# Patient Record
Sex: MALE | Race: WHITE | NOT HISPANIC OR LATINO | URBAN - METROPOLITAN AREA
[De-identification: names, ages, dates, MRNs, and addresses within clinical notes are randomized per-mention and may not be internally consistent; named-entity substitution may affect disease eponyms.]

---

## 2017-11-21 ENCOUNTER — APPOINTMENT (OUTPATIENT)
Dept: URBAN - METROPOLITAN AREA CLINIC 217 | Age: 82
Setting detail: DERMATOLOGY
End: 2017-11-22

## 2017-11-21 DIAGNOSIS — L57.0 ACTINIC KERATOSIS: ICD-10-CM

## 2017-11-21 DIAGNOSIS — L40.0 PSORIASIS VULGARIS: ICD-10-CM

## 2017-11-21 PROCEDURE — OTHER COUNSELING: OTHER

## 2017-11-21 PROCEDURE — OTHER TREATMENT REGIMEN: OTHER

## 2017-11-21 PROCEDURE — 17000 DESTRUCT PREMALG LESION: CPT

## 2017-11-21 PROCEDURE — 99202 OFFICE O/P NEW SF 15 MIN: CPT | Mod: 25

## 2017-11-21 PROCEDURE — OTHER MIPS QUALITY: OTHER

## 2017-11-21 PROCEDURE — OTHER LIQUID NITROGEN: OTHER

## 2017-11-21 PROCEDURE — 17003 DESTRUCT PREMALG LES 2-14: CPT

## 2017-11-21 ASSESSMENT — LOCATION DETAILED DESCRIPTION DERM
LOCATION DETAILED: LEFT SUPERIOR HELIX
LOCATION DETAILED: RIGHT SUPERIOR HELIX
LOCATION DETAILED: LEFT FOREHEAD
LOCATION DETAILED: LEFT SUPERIOR MEDIAL FOREHEAD
LOCATION DETAILED: LEFT LATERAL FOREHEAD

## 2017-11-21 ASSESSMENT — LOCATION ZONE DERM
LOCATION ZONE: FACE
LOCATION ZONE: EAR

## 2017-11-21 ASSESSMENT — LOCATION SIMPLE DESCRIPTION DERM
LOCATION SIMPLE: LEFT FOREHEAD
LOCATION SIMPLE: RIGHT EAR
LOCATION SIMPLE: LEFT EAR

## 2017-11-21 NOTE — PROCEDURE: MIPS QUALITY
Detail Level: Detailed
Quality 226: Preventive Care And Screening: Tobacco Use: Screening And Cessation Intervention: Patient screened for tobacco and is a smoker AND received Cessation Counseling
Quality 110: Preventive Care And Screening: Influenza Immunization: Influenza Immunization previously received during influenza season

## 2017-11-21 NOTE — PROCEDURE: LIQUID NITROGEN
Post-Care Instructions: I reviewed with the patient in detail post-care instructions. Patient is to wear sunprotection, and avoid picking at any of the treated lesions. Pt may apply Vaseline to crusted or scabbing areas.  Cryosurgery handout given and discussed.
Duration Of Freeze Thaw-Cycle (Seconds): 7
Render Post-Care Instructions In Note?: no
Consent: The patient's consent was obtained including but not limited to risks of crusting, scabbing, blistering, scarring, darker or lighter pigmentary change, recurrence, incomplete removal and infection.
Detail Level: Zone
Number Of Freeze-Thaw Cycles: 1 freeze-thaw cycle

## 2018-05-23 ENCOUNTER — APPOINTMENT (OUTPATIENT)
Dept: URBAN - METROPOLITAN AREA CLINIC 217 | Age: 83
Setting detail: DERMATOLOGY
End: 2018-05-23

## 2018-05-23 DIAGNOSIS — L40.0 PSORIASIS VULGARIS: ICD-10-CM

## 2018-05-23 DIAGNOSIS — L57.0 ACTINIC KERATOSIS: ICD-10-CM

## 2018-05-23 DIAGNOSIS — L81.4 OTHER MELANIN HYPERPIGMENTATION: ICD-10-CM

## 2018-05-23 PROCEDURE — 99213 OFFICE O/P EST LOW 20 MIN: CPT | Mod: 25

## 2018-05-23 PROCEDURE — 17000 DESTRUCT PREMALG LESION: CPT

## 2018-05-23 PROCEDURE — OTHER COUNSELING: OTHER

## 2018-05-23 PROCEDURE — OTHER REASSURANCE: OTHER

## 2018-05-23 PROCEDURE — 17003 DESTRUCT PREMALG LES 2-14: CPT

## 2018-05-23 PROCEDURE — OTHER LIQUID NITROGEN: OTHER

## 2018-05-23 PROCEDURE — OTHER TREATMENT REGIMEN: OTHER

## 2018-05-23 ASSESSMENT — LOCATION ZONE DERM
LOCATION ZONE: FACE
LOCATION ZONE: SCALP
LOCATION ZONE: TRUNK

## 2018-05-23 ASSESSMENT — LOCATION DETAILED DESCRIPTION DERM
LOCATION DETAILED: RIGHT SUPERIOR MEDIAL FOREHEAD
LOCATION DETAILED: RIGHT CENTRAL FRONTAL SCALP
LOCATION DETAILED: RIGHT SUPERIOR MEDIAL UPPER BACK
LOCATION DETAILED: LEFT MEDIAL SUPERIOR CHEST
LOCATION DETAILED: LEFT CENTRAL EYEBROW
LOCATION DETAILED: LEFT SUPERIOR PARIETAL SCALP
LOCATION DETAILED: LEFT LATERAL TEMPLE
LOCATION DETAILED: MEDIAL FRONTAL SCALP

## 2018-05-23 ASSESSMENT — LOCATION SIMPLE DESCRIPTION DERM
LOCATION SIMPLE: FRONTAL SCALP
LOCATION SIMPLE: RIGHT UPPER BACK
LOCATION SIMPLE: LEFT TEMPLE
LOCATION SIMPLE: RIGHT FOREHEAD
LOCATION SIMPLE: RIGHT SCALP
LOCATION SIMPLE: LEFT EYEBROW
LOCATION SIMPLE: CHEST
LOCATION SIMPLE: SCALP

## 2018-05-23 NOTE — PROCEDURE: TREATMENT REGIMEN
Action 1: Continue
Detail Level: Detailed
Samples Given: Taclonex scalp solution BID PRN
Continue Regimen: Taclonex scalp and hands solution BID PRN

## 2018-05-23 NOTE — PROCEDURE: LIQUID NITROGEN
Detail Level: Zone
Duration Of Freeze Thaw-Cycle (Seconds): 7
Post-Care Instructions: I reviewed with the patient in detail post-care instructions. Patient is to wear sunprotection, and avoid picking at any of the treated lesions. Pt may apply Vaseline to crusted or scabbing areas.  Cryosurgery handout given and discussed.
Number Of Freeze-Thaw Cycles: 1 freeze-thaw cycle
Consent: The patient's consent was obtained including but not limited to risks of crusting, scabbing, blistering, scarring, darker or lighter pigmentary change, recurrence, incomplete removal and infection.
Render Post-Care Instructions In Note?: no

## 2018-12-05 ENCOUNTER — APPOINTMENT (OUTPATIENT)
Dept: URBAN - METROPOLITAN AREA CLINIC 217 | Age: 83
Setting detail: DERMATOLOGY
End: 2018-12-06

## 2018-12-05 DIAGNOSIS — L82.0 INFLAMED SEBORRHEIC KERATOSIS: ICD-10-CM

## 2018-12-05 DIAGNOSIS — L57.0 ACTINIC KERATOSIS: ICD-10-CM

## 2018-12-05 DIAGNOSIS — L40.0 PSORIASIS VULGARIS: ICD-10-CM

## 2018-12-05 PROCEDURE — OTHER LIQUID NITROGEN: OTHER

## 2018-12-05 PROCEDURE — OTHER SHAVE REMOVAL (NO PATHOLOGY): OTHER

## 2018-12-05 PROCEDURE — OTHER MIPS QUALITY: OTHER

## 2018-12-05 PROCEDURE — 99213 OFFICE O/P EST LOW 20 MIN: CPT | Mod: 25

## 2018-12-05 PROCEDURE — 17000 DESTRUCT PREMALG LESION: CPT

## 2018-12-05 PROCEDURE — OTHER COUNSELING: OTHER

## 2018-12-05 PROCEDURE — 11302 SHAVE SKIN LESION 1.1-2.0 CM: CPT | Mod: 59

## 2018-12-05 PROCEDURE — OTHER TREATMENT REGIMEN: OTHER

## 2018-12-05 PROCEDURE — 17003 DESTRUCT PREMALG LES 2-14: CPT

## 2018-12-05 ASSESSMENT — PGA PSORIASIS: PGA PSORIASIS: MILD (MILD PLAQUE ELEVATION, LIGHT ERYTHEMA, FINE SCALE PREDOMINATES)

## 2018-12-05 ASSESSMENT — LOCATION DETAILED DESCRIPTION DERM
LOCATION DETAILED: LEFT SUPERIOR PARIETAL SCALP
LOCATION DETAILED: RIGHT SUPERIOR FOREHEAD
LOCATION DETAILED: LEFT MID-UPPER BACK

## 2018-12-05 ASSESSMENT — LOCATION ZONE DERM
LOCATION ZONE: SCALP
LOCATION ZONE: TRUNK
LOCATION ZONE: FACE

## 2018-12-05 ASSESSMENT — LOCATION SIMPLE DESCRIPTION DERM
LOCATION SIMPLE: SCALP
LOCATION SIMPLE: LEFT UPPER BACK
LOCATION SIMPLE: RIGHT FOREHEAD

## 2018-12-05 NOTE — PROCEDURE: MIPS QUALITY
Quality 110: Preventive Care And Screening: Influenza Immunization: Influenza Immunization previously received during influenza season
Quality 226: Preventive Care And Screening: Tobacco Use: Screening And Cessation Intervention: Patient screened for tobacco use, is a smoker AND received Cessation Counseling
Detail Level: Detailed
Quality 111:Pneumonia Vaccination Status For Older Adults: Pneumococcal Vaccination Previously Received

## 2018-12-05 NOTE — PROCEDURE: SHAVE REMOVAL (NO PATHOLOGY)
Post-Care Instructions: I reviewed with the patient in detail post-care instructions. Patient is to keep the office bandage on and dry overnight, and then apply Polysporin, Bacitracin or Vasoline daily until healed.  Patient may irrigate with water to remove any crusting.  Wound Care written instructions given.
Detail Level: Detailed
Include Z78.9 (Other Specified Conditions Influencing Health Status) As An Associated Diagnosis?: No
Render Post-Care Instructions In Note?: yes
Consent was obtained from the patient. The risks and benefits to therapy were discussed in detail. Specifically, the risks of infection, scarring, bleeding, prolonged wound healing, incomplete removal, allergy to anesthesia, nerve injury and recurrence were addressed. Prior to the procedure, the treatment site was clearly identified and confirmed by the patient.
Size Of Lesion In Cm: 1.7
Anesthesia Type: 1% lidocaine with epinephrine
Anesthesia Volume In Cc: 1
Medical Necessity Information: It is in your best interest to select a reason for this procedure from the list below. All of these items fulfill various CMS LCD requirements except the new and changing color options.
Medical Necessity Clause: This procedure was medically necessary because the lesion that was treated was: irritated and rubbing.
Hemostasis: Aluminum Chloride and Electrocautery
Wound Care: Vaseline

## 2018-12-05 NOTE — PROCEDURE: LIQUID NITROGEN
Consent: The patient's consent was obtained including but not limited to risks of crusting, scabbing, blistering, scarring, darker or lighter pigmentary change, recurrence, incomplete removal and infection.
Detail Level: Zone
Render Post-Care Instructions In Note?: no
Number Of Freeze-Thaw Cycles: 1 freeze-thaw cycle
Duration Of Freeze Thaw-Cycle (Seconds): 7
Post-Care Instructions: I reviewed with the patient in detail post-care instructions. Patient is to wear sunprotection, and avoid picking at any of the treated lesions. Pt may apply Vaseline to crusted or scabbing areas.  Cryosurgery handout given and discussed.

## 2019-06-05 ENCOUNTER — APPOINTMENT (OUTPATIENT)
Dept: URBAN - METROPOLITAN AREA CLINIC 217 | Age: 84
Setting detail: DERMATOLOGY
End: 2019-06-05

## 2019-06-05 DIAGNOSIS — L40.0 PSORIASIS VULGARIS: ICD-10-CM

## 2019-06-05 DIAGNOSIS — D485 NEOPLASM OF UNCERTAIN BEHAVIOR OF SKIN: ICD-10-CM

## 2019-06-05 PROBLEM — D48.5 NEOPLASM OF UNCERTAIN BEHAVIOR OF SKIN: Status: ACTIVE | Noted: 2019-06-05

## 2019-06-05 PROCEDURE — OTHER SCALLOP BIOPSY: OTHER

## 2019-06-05 PROCEDURE — 99213 OFFICE O/P EST LOW 20 MIN: CPT | Mod: 25

## 2019-06-05 PROCEDURE — OTHER COUNSELING: OTHER

## 2019-06-05 PROCEDURE — OTHER TREATMENT REGIMEN: OTHER

## 2019-06-05 PROCEDURE — 11102 TANGNTL BX SKIN SINGLE LES: CPT

## 2019-06-05 ASSESSMENT — LOCATION DETAILED DESCRIPTION DERM: LOCATION DETAILED: RIGHT SUPERIOR POSTERIOR NECK

## 2019-06-05 ASSESSMENT — PGA PSORIASIS: PGA PSORIASIS: MINIMAL (MINIMAL PLAQUE ELEVATION, FAINT ERYTHEMA, OCCASIONAL FINE SCALE)

## 2019-06-05 ASSESSMENT — LOCATION SIMPLE DESCRIPTION DERM: LOCATION SIMPLE: POSTERIOR NECK

## 2019-06-05 ASSESSMENT — LOCATION ZONE DERM: LOCATION ZONE: NECK

## 2019-06-05 NOTE — PROCEDURE: SCALLOP BIOPSY
Consent: Verbal consent was obtained and risks were reviewed including but not limited to scarring, infection, bleeding, scabbing, incomplete removal, nerve damage and allergy to anesthesia.
Additional Anticipated Plans: If malignant consider Mohs surgery
Additional Anesthesia Volume In Cc (Will Not Render If 0): 0
Biopsy Type: H and E
Render Post-Care Instructions In Note?: yes
Depth Of Biopsy: dermis
Anesthesia Type: 1% Xylocaine without epinephrine
Hemostasis: Aluminum Chloride
Notification Instructions: Patient will be notified of biopsy results. However, patient instructed to call the office if not contacted within 2 weeks.
Billing Type: Third-Party Bill
Post-Care Instructions: I reviewed with the patient in detail post-care instructions. Patient is to keep the office bandage on and dry overnight, and then apply Polysporin, Bacitracin or Vasoline daily until healed.  Patient may irrigate with water to remove any crusting.  Wound Care written instructions given.
Anesthesia Volume In Cc (Will Not Render If 0): 1
Bill For Surgical Tray: no
Size Of Lesion In Cm (Optional): 2
Biopsy Method: Dermablade
Wound Care: Vaseline
Detail Level: Simple
Path Notes (To The Dermatopathologist): Cc: Dr. Vinayak Cox \\nIncisional bx only\\nArisen at site of congenital hemangioma and area involving psoriasis
Anticipated Plan (Based On Presumed Biopsy Results): Further management pending pathology report

## 2019-06-05 NOTE — PROCEDURE: TREATMENT REGIMEN
Detail Level: Detailed
Action 2: Continue
Continue Regimen: Taclonex solution BID PRN
Samples Given: Taclonex

## 2019-06-17 ENCOUNTER — APPOINTMENT (OUTPATIENT)
Dept: URBAN - METROPOLITAN AREA CLINIC 217 | Age: 84
Setting detail: DERMATOLOGY
End: 2019-06-17

## 2019-06-17 DIAGNOSIS — L28.1 PRURIGO NODULARIS: ICD-10-CM

## 2019-06-17 PROCEDURE — OTHER PATHOLOGY DISCUSSION: OTHER

## 2019-06-17 PROCEDURE — 99213 OFFICE O/P EST LOW 20 MIN: CPT | Mod: 25

## 2019-06-17 PROCEDURE — 11900 INJECT SKIN LESIONS </W 7: CPT

## 2019-06-17 PROCEDURE — OTHER REASSURANCE: OTHER

## 2019-06-17 PROCEDURE — OTHER COUNSELING: OTHER

## 2019-06-17 PROCEDURE — OTHER INTRALESIONAL KENALOG: OTHER

## 2019-06-17 PROCEDURE — OTHER SEPARATE AND IDENTIFIABLE DOCUMENTATION: OTHER

## 2019-06-17 ASSESSMENT — LOCATION SIMPLE DESCRIPTION DERM: LOCATION SIMPLE: POSTERIOR NECK

## 2019-06-17 ASSESSMENT — LOCATION DETAILED DESCRIPTION DERM: LOCATION DETAILED: RIGHT SUPERIOR POSTERIOR NECK

## 2019-06-17 ASSESSMENT — LOCATION ZONE DERM: LOCATION ZONE: NECK

## 2019-06-17 NOTE — PROCEDURE: INTRALESIONAL KENALOG
Kenalog Preparation: Kenalog
Detail Level: Simple
Total Volume Injected (Ccs- Only Use Numbers And Decimals): 0.6
Concentration Of Solution Injected (Mg/Ml): 10.0
X Size Of Lesion In Cm (Optional): 0
Medical Necessity Clause: This procedure was medically necessary because the lesions that were treated were:
Include Z78.9 (Other Specified Conditions Influencing Health Status) As An Associated Diagnosis?: No
Consent: The risks of atrophy were reviewed with the patient.
Administered By (Optional): Dr. Love

## 2019-12-18 ENCOUNTER — APPOINTMENT (OUTPATIENT)
Dept: URBAN - METROPOLITAN AREA CLINIC 217 | Age: 84
Setting detail: DERMATOLOGY
End: 2019-12-18

## 2019-12-18 DIAGNOSIS — L40.0 PSORIASIS VULGARIS: ICD-10-CM

## 2019-12-18 PROCEDURE — OTHER MIPS QUALITY: OTHER

## 2019-12-18 PROCEDURE — OTHER TREATMENT REGIMEN: OTHER

## 2019-12-18 PROCEDURE — 99213 OFFICE O/P EST LOW 20 MIN: CPT

## 2019-12-18 PROCEDURE — OTHER COUNSELING: OTHER

## 2019-12-18 ASSESSMENT — PGA PSORIASIS: PGA PSORIASIS: MINIMAL (MINIMAL PLAQUE ELEVATION, FAINT ERYTHEMA, OCCASIONAL FINE SCALE)

## 2019-12-18 ASSESSMENT — BSA PSORIASIS: % BODY COVERED IN PSORIASIS: 1

## 2019-12-18 NOTE — PROCEDURE: MIPS QUALITY
Quality 130: Documentation Of Current Medications In The Medical Record: Current Medications Documented
Quality 226: Preventive Care And Screening: Tobacco Use: Screening And Cessation Intervention: Patient screened for tobacco use and is an ex/non-smoker
Quality 474: Zoster Vaccination Status: Shingrix vaccine was not administered for reasons documented by clinician (e.g. patient administered vaccine other than Shingrix, patient allergy or other medical reasons, patient declined or other patient reasons, vaccine not available or other system reasons)
Detail Level: Detailed
Quality 111:Pneumonia Vaccination Status For Older Adults: Pneumococcal Vaccination Previously Received
Quality 110: Preventive Care And Screening: Influenza Immunization: Influenza Immunization previously received during influenza season

## 2019-12-18 NOTE — PROCEDURE: TREATMENT REGIMEN
Continue Regimen: Taclonex solution BID PRN
Detail Level: Detailed
Action 4: Continue
Samples Given: Taclonex

## 2020-10-28 ENCOUNTER — APPOINTMENT (OUTPATIENT)
Dept: URBAN - METROPOLITAN AREA CLINIC 217 | Age: 85
Setting detail: DERMATOLOGY
End: 2020-10-28

## 2020-10-28 VITALS — TEMPERATURE: 98 F

## 2020-10-28 DIAGNOSIS — D485 NEOPLASM OF UNCERTAIN BEHAVIOR OF SKIN: ICD-10-CM

## 2020-10-28 PROBLEM — D48.5 NEOPLASM OF UNCERTAIN BEHAVIOR OF SKIN: Status: ACTIVE | Noted: 2020-10-28

## 2020-10-28 PROCEDURE — 11102 TANGNTL BX SKIN SINGLE LES: CPT

## 2020-10-28 PROCEDURE — OTHER MIPS QUALITY: OTHER

## 2020-10-28 PROCEDURE — 11103 TANGNTL BX SKIN EA SEP/ADDL: CPT

## 2020-10-28 PROCEDURE — OTHER SCALLOP BIOPSY: OTHER

## 2020-10-28 ASSESSMENT — LOCATION DETAILED DESCRIPTION DERM
LOCATION DETAILED: RIGHT LATERAL TRAPEZIAL NECK
LOCATION DETAILED: RIGHT POSTERIOR NECK

## 2020-10-28 ASSESSMENT — LOCATION ZONE DERM: LOCATION ZONE: NECK

## 2020-10-28 ASSESSMENT — LOCATION SIMPLE DESCRIPTION DERM: LOCATION SIMPLE: POSTERIOR NECK

## 2020-10-28 NOTE — PROCEDURE: SCALLOP BIOPSY
Path Notes (To The Dermatopathologist): Cc: Please see assession. # [id number]\\nIncisiobal bx only confirmed by John Wong 10/28/20 Path Notes (To The Dermatopathologist): Cc: Please see assession. # [id number]\\nIncisiobal bx only confirmed by John Wong 10/28/20

## 2020-10-28 NOTE — PROCEDURE: SCALLOP BIOPSY
Path Notes (To The Dermatopathologist): Cc:  \\nIncisional biopsy only 10/28/20 confirmed by John Wong

## 2020-10-28 NOTE — PROCEDURE: MIPS QUALITY
Quality 226: Preventive Care And Screening: Tobacco Use: Screening And Cessation Intervention: Patient screened for tobacco use and is an ex/non-smoker
Quality 130: Documentation Of Current Medications In The Medical Record: Current Medications Documented
Detail Level: Generalized
Quality 110: Preventive Care And Screening: Influenza Immunization: Influenza Immunization Administered during Influenza season
Quality 111:Pneumonia Vaccination Status For Older Adults: Pneumococcal Vaccination Previously Received

## 2020-11-17 ENCOUNTER — APPOINTMENT (OUTPATIENT)
Dept: URBAN - METROPOLITAN AREA CLINIC 217 | Age: 85
Setting detail: DERMATOLOGY
End: 2020-11-17

## 2020-11-17 VITALS — TEMPERATURE: 97.7 F

## 2020-11-17 DIAGNOSIS — L259 CONTACT DERMATITIS AND OTHER ECZEMA, UNSPECIFIED CAUSE: ICD-10-CM

## 2020-11-17 PROBLEM — L30.8 OTHER SPECIFIED DERMATITIS: Status: ACTIVE | Noted: 2020-11-17

## 2020-11-17 PROCEDURE — 99213 OFFICE O/P EST LOW 20 MIN: CPT

## 2020-11-17 PROCEDURE — OTHER DIAGNOSIS COMMENT: OTHER

## 2020-11-17 PROCEDURE — OTHER TREATMENT REGIMEN: OTHER

## 2020-11-17 PROCEDURE — OTHER COUNSELING: OTHER

## 2020-11-17 PROCEDURE — OTHER PATHOLOGY DISCUSSION: OTHER

## 2020-11-17 ASSESSMENT — SEVERITY ASSESSMENT 2020: SEVERITY 2020: MODERATE

## 2020-11-17 NOTE — PROCEDURE: TREATMENT REGIMEN
Samples Given: Lexette 0.05% foam BID PRN (50 g)
Detail Level: Detailed
Plan: Will recheck in 1 month

## 2020-12-17 ENCOUNTER — APPOINTMENT (OUTPATIENT)
Dept: URBAN - METROPOLITAN AREA CLINIC 217 | Age: 85
Setting detail: DERMATOLOGY
End: 2020-12-17

## 2020-12-17 VITALS — TEMPERATURE: 97.6 F

## 2020-12-17 DIAGNOSIS — L259 CONTACT DERMATITIS AND OTHER ECZEMA, UNSPECIFIED CAUSE: ICD-10-CM

## 2020-12-17 PROBLEM — L30.8 OTHER SPECIFIED DERMATITIS: Status: ACTIVE | Noted: 2020-12-17

## 2020-12-17 PROBLEM — Z85.46 PERSONAL HISTORY OF MALIGNANT NEOPLASM OF PROSTATE: Status: ACTIVE | Noted: 2020-12-17

## 2020-12-17 PROCEDURE — OTHER TREATMENT REGIMEN: OTHER

## 2020-12-17 PROCEDURE — OTHER DIAGNOSIS COMMENT: OTHER

## 2020-12-17 PROCEDURE — OTHER COUNSELING: OTHER

## 2020-12-17 PROCEDURE — 99212 OFFICE O/P EST SF 10 MIN: CPT

## 2020-12-17 ASSESSMENT — PAIN INTENSITY VAS: HOW INTENSE IS YOUR PAIN 0 BEING NO PAIN, 10 BEING THE MOST SEVERE PAIN POSSIBLE?: NO PAIN

## 2020-12-17 ASSESSMENT — SEVERITY ASSESSMENT 2020: SEVERITY 2020: ALMOST CLEAR

## 2020-12-17 NOTE — PROCEDURE: TREATMENT REGIMEN
Detail Level: Detailed
Continue Regimen: Lexette 0.05% foam BID x1 more week to residual area then DC

## 2021-03-29 ENCOUNTER — RX ONLY (RX ONLY)
Age: 86
End: 2021-03-29

## 2021-06-03 ENCOUNTER — APPOINTMENT (OUTPATIENT)
Dept: URBAN - METROPOLITAN AREA CLINIC 217 | Age: 86
Setting detail: DERMATOLOGY
End: 2021-06-03

## 2021-06-03 VITALS — TEMPERATURE: 97.1 F

## 2021-06-03 DIAGNOSIS — L259 CONTACT DERMATITIS AND OTHER ECZEMA, UNSPECIFIED CAUSE: ICD-10-CM

## 2021-06-03 PROBLEM — L30.8 OTHER SPECIFIED DERMATITIS: Status: ACTIVE | Noted: 2021-06-03

## 2021-06-03 PROCEDURE — OTHER TREATMENT REGIMEN: OTHER

## 2021-06-03 PROCEDURE — 99213 OFFICE O/P EST LOW 20 MIN: CPT

## 2021-06-03 PROCEDURE — OTHER DIAGNOSIS COMMENT: OTHER

## 2021-06-03 PROCEDURE — OTHER PRESCRIPTION: OTHER

## 2021-06-03 PROCEDURE — OTHER COUNSELING: OTHER

## 2021-06-03 RX ORDER — CLOBETASOL PROPIONATE 0.5 MG/G
AEROSOL, FOAM TOPICAL
Qty: 1 | Refills: 2 | Status: ERX | COMMUNITY
Start: 2021-06-03

## 2021-06-03 ASSESSMENT — SEVERITY ASSESSMENT 2020: SEVERITY 2020: MODERATE

## 2021-06-03 NOTE — PROCEDURE: TREATMENT REGIMEN
Initiate Treatment: Clobetasol 0.05% foam BID PRN to scalp and neck. Never to face. D/C when better, resume for flares
Detail Level: Detailed
Discontinue Regimen: Lexette 0.05% foam due to loss of efficacy

## 2021-11-03 ENCOUNTER — APPOINTMENT (OUTPATIENT)
Dept: URBAN - METROPOLITAN AREA CLINIC 217 | Age: 86
Setting detail: DERMATOLOGY
End: 2021-11-03

## 2021-11-03 DIAGNOSIS — L259 CONTACT DERMATITIS AND OTHER ECZEMA, UNSPECIFIED CAUSE: ICD-10-CM

## 2021-11-03 DIAGNOSIS — L57.0 ACTINIC KERATOSIS: ICD-10-CM

## 2021-11-03 PROBLEM — L30.8 OTHER SPECIFIED DERMATITIS: Status: ACTIVE | Noted: 2021-11-03

## 2021-11-03 PROCEDURE — OTHER INTRALESIONAL KENALOG: OTHER

## 2021-11-03 PROCEDURE — 17000 DESTRUCT PREMALG LESION: CPT

## 2021-11-03 PROCEDURE — OTHER COUNSELING: OTHER

## 2021-11-03 PROCEDURE — OTHER DIAGNOSIS COMMENT: OTHER

## 2021-11-03 PROCEDURE — 11900 INJECT SKIN LESIONS </W 7: CPT | Mod: 59

## 2021-11-03 PROCEDURE — OTHER LIQUID NITROGEN: OTHER

## 2021-11-03 PROCEDURE — 17003 DESTRUCT PREMALG LES 2-14: CPT

## 2021-11-03 PROCEDURE — OTHER TREATMENT REGIMEN: OTHER

## 2021-11-03 PROCEDURE — OTHER MIPS QUALITY: OTHER

## 2021-11-03 ASSESSMENT — LOCATION DETAILED DESCRIPTION DERM
LOCATION DETAILED: LEFT CENTRAL PARIETAL SCALP
LOCATION DETAILED: LEFT SUPERIOR PARIETAL SCALP
LOCATION DETAILED: RIGHT INFERIOR OCCIPITAL SCALP

## 2021-11-03 ASSESSMENT — LOCATION SIMPLE DESCRIPTION DERM
LOCATION SIMPLE: SCALP
LOCATION SIMPLE: POSTERIOR SCALP

## 2021-11-03 ASSESSMENT — LOCATION ZONE DERM: LOCATION ZONE: SCALP

## 2021-11-03 NOTE — PROCEDURE: INTRALESIONAL KENALOG
Validate Note Data When Using Inventory: Yes
Detail Level: Simple
Total Volume Injected (Ccs- Only Use Numbers And Decimals): .2
Include Z78.9 (Other Specified Conditions Influencing Health Status) As An Associated Diagnosis?: No
Expiration Date (Optional): 12/2022
Medical Necessity Clause: This procedure was medically necessary because the lesions that were treated were:
Kenalog Preparation: Kenalog
X Size Of Lesion In Cm (Optional): 0
Consent: The risks of atrophy were reviewed with the patient.
Administered By (Optional): Dr. Love
Concentration Of Solution Injected (Mg/Ml): 10.0

## 2021-11-03 NOTE — HPI: EVALUATION OF SKIN LESION(S)
Hpi Title: Evaluation of Skin Lesions
Additional History: Patient here for yearly skin exam.\\n\\nMonitoring the lesion on the right posterior scalp. \\n\\nPatient was screened before evaluation for COVID-19 by inquiring about fever, shortness of breath, weakness, fatigue, loss of taste or smell, and gastrointestinal symptoms.  Patient denied any of the above.

## 2021-11-03 NOTE — PROCEDURE: TREATMENT REGIMEN
Continue Regimen: Clobetasol 0.05% foam BID PRN to scalp and neck. Never to face. D/C when better, resume for flares
Detail Level: Detailed

## 2021-11-03 NOTE — PROCEDURE: MIPS QUALITY
Detail Level: Generalized
Quality 111:Pneumonia Vaccination Status For Older Adults: Pneumococcal Vaccination not Administered or Previously Received, Reason not Otherwise Specified
Quality 110: Preventive Care And Screening: Influenza Immunization: Influenza Immunization Administered during Influenza season
Quality 130: Documentation Of Current Medications In The Medical Record: Current Medications Documented

## 2021-11-03 NOTE — PROCEDURE: LIQUID NITROGEN
Duration Of Freeze Thaw-Cycle (Seconds): 3
Render Note In Bullet Format When Appropriate: No
Show Applicator Variable?: Yes
Number Of Freeze-Thaw Cycles: 1 freeze-thaw cycle
Consent: The patient's consent was obtained including but not limited to risks of crusting, scabbing, blistering, scarring, darker or lighter pigmentary change, recurrence, incomplete removal and infection.
Detail Level: Simple
Application Tool (Optional): Liquid Nitrogen Sprayer
Post-Care Instructions: I reviewed with the patient in detail post-care instructions. Patient is to wear sunprotection, and avoid picking at any of the treated lesions. Pt may apply Vaseline to crusted or scabbing areas.  Cryosurgery handout given and discussed.

## 2022-11-02 ENCOUNTER — APPOINTMENT (OUTPATIENT)
Dept: URBAN - METROPOLITAN AREA CLINIC 217 | Age: 87
Setting detail: DERMATOLOGY
End: 2022-11-02

## 2022-11-02 DIAGNOSIS — L57.0 ACTINIC KERATOSIS: ICD-10-CM

## 2022-11-02 DIAGNOSIS — L259 CONTACT DERMATITIS AND OTHER ECZEMA, UNSPECIFIED CAUSE: ICD-10-CM

## 2022-11-02 DIAGNOSIS — L81.4 OTHER MELANIN HYPERPIGMENTATION: ICD-10-CM

## 2022-11-02 DIAGNOSIS — D18.0 HEMANGIOMA: ICD-10-CM

## 2022-11-02 PROBLEM — L30.8 OTHER SPECIFIED DERMATITIS: Status: ACTIVE | Noted: 2022-11-02

## 2022-11-02 PROBLEM — D18.01 HEMANGIOMA OF SKIN AND SUBCUTANEOUS TISSUE: Status: ACTIVE | Noted: 2022-11-02

## 2022-11-02 PROCEDURE — OTHER TREATMENT REGIMEN: OTHER

## 2022-11-02 PROCEDURE — OTHER LIQUID NITROGEN: OTHER

## 2022-11-02 PROCEDURE — OTHER REASSURANCE: OTHER

## 2022-11-02 PROCEDURE — OTHER COUNSELING: OTHER

## 2022-11-02 PROCEDURE — 17000 DESTRUCT PREMALG LESION: CPT

## 2022-11-02 PROCEDURE — 99213 OFFICE O/P EST LOW 20 MIN: CPT | Mod: 25

## 2022-11-02 PROCEDURE — OTHER MIPS QUALITY: OTHER

## 2022-11-02 PROCEDURE — 11900 INJECT SKIN LESIONS </W 7: CPT | Mod: 59

## 2022-11-02 PROCEDURE — OTHER INTRALESIONAL KENALOG: OTHER

## 2022-11-02 PROCEDURE — 17003 DESTRUCT PREMALG LES 2-14: CPT

## 2022-11-02 ASSESSMENT — LOCATION SIMPLE DESCRIPTION DERM
LOCATION SIMPLE: RIGHT UPPER BACK
LOCATION SIMPLE: RIGHT FOREHEAD
LOCATION SIMPLE: LEFT UPPER BACK
LOCATION SIMPLE: RIGHT SCALP
LOCATION SIMPLE: LEFT FOREHEAD
LOCATION SIMPLE: POSTERIOR NECK
LOCATION SIMPLE: CHEST

## 2022-11-02 ASSESSMENT — SEVERITY ASSESSMENT 2020: SEVERITY 2020: MILD

## 2022-11-02 ASSESSMENT — LOCATION ZONE DERM
LOCATION ZONE: SCALP
LOCATION ZONE: FACE
LOCATION ZONE: NECK
LOCATION ZONE: TRUNK

## 2022-11-02 ASSESSMENT — ITCH NUMERIC RATING SCALE: HOW SEVERE IS YOUR ITCHING?: 8

## 2022-11-02 ASSESSMENT — LOCATION DETAILED DESCRIPTION DERM
LOCATION DETAILED: RIGHT CENTRAL FRONTAL SCALP
LOCATION DETAILED: LEFT MEDIAL SUPERIOR CHEST
LOCATION DETAILED: LEFT MEDIAL FOREHEAD
LOCATION DETAILED: RIGHT FOREHEAD
LOCATION DETAILED: LEFT MID-UPPER BACK
LOCATION DETAILED: RIGHT MEDIAL TRAPEZIAL NECK
LOCATION DETAILED: RIGHT SUPERIOR UPPER BACK

## 2022-11-02 ASSESSMENT — BSA RASH: BSA RASH: 1

## 2022-11-02 NOTE — PROCEDURE: INTRALESIONAL KENALOG
How Many Mls Were Removed From The 40 Mg/Ml (5ml) Vial When Preparing The Injectable Solution?: 0
Kenalog Preparation: Kenalog
Administered By (Optional): Dr. Juan Love
Show Inventory Tab: Hide
Treatment Number (Optional): 1
Consent: The risks of atrophy were reviewed with the patient.
Total Volume Injected (Ccs- Only Use Numbers And Decimals): 0.5ml
Detail Level: Detailed
Include Z78.9 (Other Specified Conditions Influencing Health Status) As An Associated Diagnosis?: No
Validate Note Data When Using Inventory: Yes
Concentration Of Solution Injected (Mg/Ml): 10.0
Medical Necessity Clause: This procedure was medically necessary because the lesions that were treated were:

## 2022-11-02 NOTE — PROCEDURE: LIQUID NITROGEN
Post-Care Instructions: I reviewed with the patient in detail post-care instructions. Patient is to wear sunprotection, and avoid picking at any of the treated lesions. Pt may apply Vaseline to crusted or scabbing areas.
Detail Level: Detailed
Show Applicator Variable?: Yes
Render Note In Bullet Format When Appropriate: No
Application Tool (Optional): Liquid Nitrogen Sprayer
Duration Of Freeze Thaw-Cycle (Seconds): 3
Number Of Freeze-Thaw Cycles: 1 freeze-thaw cycle
Consent: The patient's consent was obtained including but not limited to risks of crusting, scabbing, blistering, scarring, darker or lighter pigmentary change, recurrence, incomplete removal and infection.

## 2022-11-02 NOTE — PROCEDURE: MIPS QUALITY
Additional Notes: Patient received verbal cessation counseling (3 min or less)
Quality 226: Preventive Care And Screening: Tobacco Use: Screening And Cessation Intervention: Patient screened for tobacco use, is a smoker AND received Cessation Counseling within the Previous 12 Months
Detail Level: Detailed
Quality 110: Preventive Care And Screening: Influenza Immunization: Influenza Immunization Administered during Influenza season

## 2022-11-02 NOTE — PROCEDURE: TREATMENT REGIMEN
Detail Level: Zone
Continue Regimen: Clobetesol foam 0.05% apply BID to affected areas as needed. Stop when resolved

## 2022-11-02 NOTE — HPI: OTHER
Condition:: Eczematous Dermatitis
Please Describe Your Condition:: Patient presents today with daughter. Patient notes that he has been using the Clobetasol foam on his posterior scalp. Patient states that it does not go away but it tames it down. Patient notes that it is itchy at times. \\n\\nPatient was screened before evaluation for COVID-19 by inquiring about fever, shortness of breath, weakness, fatigue, loss of taste or smell and gastrointestinal symptoms.  Patient denied any of the above.

## 2023-11-02 ENCOUNTER — APPOINTMENT (OUTPATIENT)
Dept: URBAN - METROPOLITAN AREA CLINIC 217 | Age: 88
Setting detail: DERMATOLOGY
End: 2023-11-02

## 2023-11-02 DIAGNOSIS — L82.1 OTHER SEBORRHEIC KERATOSIS: ICD-10-CM

## 2023-11-02 DIAGNOSIS — L259 CONTACT DERMATITIS AND OTHER ECZEMA, UNSPECIFIED CAUSE: ICD-10-CM

## 2023-11-02 DIAGNOSIS — L57.0 ACTINIC KERATOSIS: ICD-10-CM

## 2023-11-02 DIAGNOSIS — L81.4 OTHER MELANIN HYPERPIGMENTATION: ICD-10-CM

## 2023-11-02 PROBLEM — L30.8 OTHER SPECIFIED DERMATITIS: Status: ACTIVE | Noted: 2023-11-02

## 2023-11-02 PROCEDURE — 17000 DESTRUCT PREMALG LESION: CPT

## 2023-11-02 PROCEDURE — OTHER COUNSELING: OTHER

## 2023-11-02 PROCEDURE — 99213 OFFICE O/P EST LOW 20 MIN: CPT | Mod: 25

## 2023-11-02 PROCEDURE — 17003 DESTRUCT PREMALG LES 2-14: CPT

## 2023-11-02 PROCEDURE — 11900 INJECT SKIN LESIONS </W 7: CPT | Mod: 59

## 2023-11-02 PROCEDURE — OTHER REASSURANCE: OTHER

## 2023-11-02 PROCEDURE — OTHER MIPS QUALITY: OTHER

## 2023-11-02 PROCEDURE — OTHER DIAGNOSIS COMMENT: OTHER

## 2023-11-02 PROCEDURE — OTHER TREATMENT REGIMEN: OTHER

## 2023-11-02 PROCEDURE — OTHER LIQUID NITROGEN: OTHER

## 2023-11-02 PROCEDURE — OTHER INTRALESIONAL KENALOG: OTHER

## 2023-11-02 ASSESSMENT — LOCATION ZONE DERM
LOCATION ZONE: FACE
LOCATION ZONE: SCALP
LOCATION ZONE: TRUNK
LOCATION ZONE: NECK
LOCATION ZONE: EAR
LOCATION ZONE: ARM

## 2023-11-02 ASSESSMENT — LOCATION SIMPLE DESCRIPTION DERM
LOCATION SIMPLE: LEFT EAR
LOCATION SIMPLE: LEFT FOREARM
LOCATION SIMPLE: POSTERIOR NECK
LOCATION SIMPLE: RIGHT EAR
LOCATION SIMPLE: RIGHT FOREHEAD
LOCATION SIMPLE: POSTERIOR SCALP
LOCATION SIMPLE: LEFT CHEEK
LOCATION SIMPLE: UPPER BACK
LOCATION SIMPLE: RIGHT FOREARM

## 2023-11-02 ASSESSMENT — LOCATION DETAILED DESCRIPTION DERM
LOCATION DETAILED: LEFT PROXIMAL RADIAL DORSAL FOREARM
LOCATION DETAILED: RIGHT SUPERIOR POSTERIOR PARIETAL SCALP
LOCATION DETAILED: LEFT SUPERIOR POSTERIOR HELIX
LOCATION DETAILED: LEFT INFERIOR CENTRAL MALAR CHEEK
LOCATION DETAILED: RIGHT INFERIOR POSTERIOR HELIX
LOCATION DETAILED: SUPERIOR THORACIC SPINE
LOCATION DETAILED: RIGHT SUPERIOR POSTERIOR HELIX
LOCATION DETAILED: RIGHT MEDIAL FOREHEAD
LOCATION DETAILED: RIGHT DISTAL DORSAL FOREARM
LOCATION DETAILED: RIGHT SUPERIOR FOREHEAD
LOCATION DETAILED: RIGHT SUPERIOR POSTERIOR NECK
LOCATION DETAILED: RIGHT PROXIMAL RADIAL DORSAL FOREARM
LOCATION DETAILED: RIGHT DISTAL RADIAL DORSAL FOREARM
LOCATION DETAILED: LEFT INFERIOR POSTERIOR HELIX

## 2023-11-02 ASSESSMENT — SEVERITY ASSESSMENT 2020: SEVERITY 2020: MILD

## 2023-11-02 ASSESSMENT — BSA RASH: BSA RASH: 1

## 2023-11-02 ASSESSMENT — ITCH NUMERIC RATING SCALE: HOW SEVERE IS YOUR ITCHING?: 1

## 2023-11-02 NOTE — PROCEDURE: LIQUID NITROGEN
Application Tool (Optional): Liquid Nitrogen Sprayer
Post-Care Instructions: I reviewed with the patient in detail post-care instructions. Patient is to wear sunprotection, and avoid picking at any of the treated lesions. Pt may apply Vaseline to crusted or scabbing areas.
Show Applicator Variable?: Yes
Duration Of Freeze Thaw-Cycle (Seconds): 1
Detail Level: Detailed
Number Of Freeze-Thaw Cycles: 1 freeze-thaw cycle
Render Post-Care Instructions In Note?: no
Consent: The patient's consent was obtained including but not limited to risks of crusting, scabbing, blistering, scarring, darker or lighter pigmentary change, recurrence, incomplete removal and infection.

## 2023-11-02 NOTE — HPI: SKIN CANCER EXAM
What Is The Reason For Today's Visit?: Skin Cancer Screening Exam
What Is The Reason For Today's Visit? (Being Monitored For X): concerning skin lesions on an annual basis
Additional History: Denies new/changing lesions.

## 2023-11-02 NOTE — PROCEDURE: INTRALESIONAL KENALOG
Validate Note Data When Using Inventory: Yes
How Many Mls Were Removed From The 40 Mg/Ml (10ml) Vial When Preparing The Injectable Solution?: 0
Include Z78.9 (Other Specified Conditions Influencing Health Status) As An Associated Diagnosis?: No
Total Volume Injected (Ccs- Only Use Numbers And Decimals): 0.5
Consent: The risks of atrophy were reviewed with the patient.
Concentration Of Solution Injected (Mg/Ml): 10.0
Detail Level: Detailed
Medical Necessity Clause: This procedure was medically necessary because the lesions that were treated were:
Kenalog Preparation: Kenalog
Kenalog Type Of Vial: Multiple Dose
Show Inventory Tab: Hide

## 2023-11-02 NOTE — PROCEDURE: DIAGNOSIS COMMENT
Comment: Biopsy proven eczematous dermatitis
Detail Level: Detailed
Render Risk Assessment In Note?: yes

## 2023-11-02 NOTE — PROCEDURE: MIPS QUALITY
Quality 226: Preventive Care And Screening: Tobacco Use: Screening And Cessation Intervention: Patient screened for tobacco use and is an ex/non-smoker
Quality 130: Documentation Of Current Medications In The Medical Record: Current Medications Documented
Detail Level: Detailed
Quality 111:Pneumonia Vaccination Status For Older Adults: Patient did not receive any pneumococcal conjugate or polysaccharide vaccine on or after their 60th birthday and before the end of the measurement period
Quality 47: Advance Care Plan: Advance Care Planning discussed and documented in the medical record; patient did not wish or was not able to name a surrogate decision maker or provide an advance care plan.
Quality 110: Preventive Care And Screening: Influenza Immunization: Influenza Immunization not Administered because Patient Refused.
Quality 431: Preventive Care And Screening: Unhealthy Alcohol Use - Screening: Patient not identified as an unhealthy alcohol user when screened for unhealthy alcohol use using a systematic screening method

## 2024-05-02 ENCOUNTER — APPOINTMENT (OUTPATIENT)
Dept: URBAN - METROPOLITAN AREA CLINIC 217 | Age: 89
Setting detail: DERMATOLOGY
End: 2024-05-02

## 2024-05-02 DIAGNOSIS — L57.0 ACTINIC KERATOSIS: ICD-10-CM

## 2024-05-02 DIAGNOSIS — D18.0 HEMANGIOMA: ICD-10-CM

## 2024-05-02 DIAGNOSIS — L81.4 OTHER MELANIN HYPERPIGMENTATION: ICD-10-CM

## 2024-05-02 DIAGNOSIS — L259 CONTACT DERMATITIS AND OTHER ECZEMA, UNSPECIFIED CAUSE: ICD-10-CM

## 2024-05-02 PROBLEM — L30.8 OTHER SPECIFIED DERMATITIS: Status: ACTIVE | Noted: 2024-05-02

## 2024-05-02 PROBLEM — D18.01 HEMANGIOMA OF SKIN AND SUBCUTANEOUS TISSUE: Status: ACTIVE | Noted: 2024-05-02

## 2024-05-02 PROCEDURE — 11900 INJECT SKIN LESIONS </W 7: CPT | Mod: 59

## 2024-05-02 PROCEDURE — OTHER MIPS QUALITY: OTHER

## 2024-05-02 PROCEDURE — OTHER DIAGNOSIS COMMENT: OTHER

## 2024-05-02 PROCEDURE — 17003 DESTRUCT PREMALG LES 2-14: CPT

## 2024-05-02 PROCEDURE — OTHER TREATMENT REGIMEN: OTHER

## 2024-05-02 PROCEDURE — OTHER INTRALESIONAL KENALOG: OTHER

## 2024-05-02 PROCEDURE — OTHER PRESCRIPTION: OTHER

## 2024-05-02 PROCEDURE — OTHER LIQUID NITROGEN: OTHER

## 2024-05-02 PROCEDURE — OTHER COUNSELING: OTHER

## 2024-05-02 PROCEDURE — OTHER REASSURANCE: OTHER

## 2024-05-02 PROCEDURE — 17000 DESTRUCT PREMALG LESION: CPT

## 2024-05-02 PROCEDURE — 99213 OFFICE O/P EST LOW 20 MIN: CPT | Mod: 25

## 2024-05-02 RX ORDER — CLOBETASOL PROPIONATE 0.5 MG/G
AEROSOL, FOAM TOPICAL
Qty: 100 | Refills: 4 | Status: ERX | COMMUNITY
Start: 2024-05-02

## 2024-05-02 ASSESSMENT — SEVERITY ASSESSMENT 2020: SEVERITY 2020: MODERATE

## 2024-05-02 ASSESSMENT — BSA RASH: BSA RASH: 3

## 2024-05-02 ASSESSMENT — LOCATION DETAILED DESCRIPTION DERM
LOCATION DETAILED: RIGHT SUPERIOR LATERAL NECK
LOCATION DETAILED: RIGHT SUPERIOR POSTERIOR NECK
LOCATION DETAILED: RIGHT MEDIAL UPPER BACK
LOCATION DETAILED: LEFT MEDIAL FRONTAL SCALP
LOCATION DETAILED: LEFT SUPERIOR MEDIAL UPPER BACK
LOCATION DETAILED: RIGHT INFERIOR POSTERIOR PARIETAL SCALP
LOCATION DETAILED: RIGHT MEDIAL FRONTAL SCALP
LOCATION DETAILED: RIGHT SUPERIOR POSTERIOR PARIETAL SCALP

## 2024-05-02 ASSESSMENT — LOCATION SIMPLE DESCRIPTION DERM
LOCATION SIMPLE: POSTERIOR SCALP
LOCATION SIMPLE: RIGHT UPPER BACK
LOCATION SIMPLE: LEFT UPPER BACK
LOCATION SIMPLE: RIGHT SCALP
LOCATION SIMPLE: POSTERIOR NECK
LOCATION SIMPLE: LEFT SCALP

## 2024-05-02 ASSESSMENT — LOCATION ZONE DERM
LOCATION ZONE: TRUNK
LOCATION ZONE: NECK
LOCATION ZONE: SCALP

## 2024-05-02 ASSESSMENT — ITCH NUMERIC RATING SCALE: HOW SEVERE IS YOUR ITCHING?: 3

## 2024-05-02 NOTE — PROCEDURE: INTRALESIONAL KENALOG
Validate Note Data When Using Inventory: Yes
How Many Mls Were Removed From The 40 Mg/Ml (10ml) Vial When Preparing The Injectable Solution?: 0
Include Z78.9 (Other Specified Conditions Influencing Health Status) As An Associated Diagnosis?: No
Total Volume (Ccs): 0.7
Consent: The risks of atrophy were reviewed with the patient.
Concentration Of Kenalog Solution Injected (Mg/Ml): 10.0
Administered By (Optional): Dr. Juan Love
Detail Level: Detailed
Medical Necessity Clause: This procedure was medically necessary because the lesions that were treated were:
Kenalog Preparation: Kenalog
Kenalog Type Of Vial: Multiple Dose
Show Inventory Tab: Hide

## 2024-05-02 NOTE — PROCEDURE: MIPS QUALITY
Quality 431: Preventive Care And Screening: Unhealthy Alcohol Use - Screening: Patient not identified as an unhealthy alcohol user when screened for unhealthy alcohol use using a systematic screening method
Quality 486: Dermatitis - Improvement In Patient-Reported Itch Severity: Itch severity assessment score was not reduced by at least 3 points from the initial (index) score to the follow-up visit score or assessment was not completed during the follow-up encounter
Quality 130: Documentation Of Current Medications In The Medical Record: Current Medications Documented
Quality 47: Advance Care Plan: Advance Care Planning discussed and documented in the medical record; patient did not wish or was not able to name a surrogate decision maker or provide an advance care plan.
Detail Level: Detailed
Quality 226: Preventive Care And Screening: Tobacco Use: Screening And Cessation Intervention: Patient screened for tobacco use, is a smoker AND received Cessation Counseling within measurement period or in the six months prior to the measurement period

## 2024-05-02 NOTE — PROCEDURE: TREATMENT REGIMEN
Detail Level: Zone
Continue Regimen: Clobetesol foam 0.05% apply BID to affected areas as needed. Stop when resolved. ( [facility] in [place])

## 2024-11-04 ENCOUNTER — APPOINTMENT (OUTPATIENT)
Dept: URBAN - METROPOLITAN AREA CLINIC 217 | Age: 89
Setting detail: DERMATOLOGY
End: 2024-11-04

## 2024-11-04 DIAGNOSIS — L259 CONTACT DERMATITIS AND OTHER ECZEMA, UNSPECIFIED CAUSE: ICD-10-CM

## 2024-11-04 PROBLEM — L30.8 OTHER SPECIFIED DERMATITIS: Status: ACTIVE | Noted: 2024-11-04

## 2024-11-04 PROCEDURE — OTHER DIAGNOSIS COMMENT: OTHER

## 2024-11-04 PROCEDURE — OTHER COUNSELING: OTHER

## 2024-11-04 PROCEDURE — OTHER TREATMENT REGIMEN: OTHER

## 2024-11-04 PROCEDURE — 99213 OFFICE O/P EST LOW 20 MIN: CPT

## 2024-11-04 ASSESSMENT — BSA RASH: BSA RASH: 1

## 2024-11-04 ASSESSMENT — LOCATION SIMPLE DESCRIPTION DERM
LOCATION SIMPLE: POSTERIOR NECK
LOCATION SIMPLE: POSTERIOR SCALP

## 2024-11-04 ASSESSMENT — SEVERITY ASSESSMENT 2020: SEVERITY 2020: MILD

## 2024-11-04 ASSESSMENT — LOCATION DETAILED DESCRIPTION DERM
LOCATION DETAILED: RIGHT SUPERIOR POSTERIOR PARIETAL SCALP
LOCATION DETAILED: RIGHT SUPERIOR POSTERIOR NECK

## 2024-11-04 ASSESSMENT — LOCATION ZONE DERM
LOCATION ZONE: NECK
LOCATION ZONE: SCALP

## 2024-11-04 ASSESSMENT — ITCH NUMERIC RATING SCALE: HOW SEVERE IS YOUR ITCHING?: 6

## 2024-11-04 ASSESSMENT — PAIN INTENSITY VAS: HOW INTENSE IS YOUR PAIN 0 BEING NO PAIN, 10 BEING THE MOST SEVERE PAIN POSSIBLE?: NO PAIN

## 2024-11-04 NOTE — PROCEDURE: TREATMENT REGIMEN
Detail Level: Zone
Continue Regimen: Clobetesol foam 0.05% apply BID to affected areas as needed. Stop when resolved. ( [facility] in [place])

## 2025-05-29 ENCOUNTER — APPOINTMENT (OUTPATIENT)
Dept: URBAN - METROPOLITAN AREA CLINIC 217 | Age: OVER 89
Setting detail: DERMATOLOGY
End: 2025-05-29

## 2025-05-29 DIAGNOSIS — D18.0 HEMANGIOMA: ICD-10-CM

## 2025-05-29 DIAGNOSIS — L259 CONTACT DERMATITIS AND OTHER ECZEMA, UNSPECIFIED CAUSE: ICD-10-CM

## 2025-05-29 PROBLEM — D18.01 HEMANGIOMA OF SKIN AND SUBCUTANEOUS TISSUE: Status: ACTIVE | Noted: 2025-05-29

## 2025-05-29 PROBLEM — L30.8 OTHER SPECIFIED DERMATITIS: Status: ACTIVE | Noted: 2025-05-29

## 2025-05-29 PROCEDURE — OTHER MIPS QUALITY: OTHER

## 2025-05-29 PROCEDURE — 11900 INJECT SKIN LESIONS </W 7: CPT

## 2025-05-29 PROCEDURE — OTHER COUNSELING: OTHER

## 2025-05-29 PROCEDURE — OTHER TREATMENT REGIMEN: OTHER

## 2025-05-29 PROCEDURE — 99212 OFFICE O/P EST SF 10 MIN: CPT | Mod: 25

## 2025-05-29 PROCEDURE — OTHER DIAGNOSIS COMMENT: OTHER

## 2025-05-29 PROCEDURE — OTHER INTRALESIONAL KENALOG: OTHER

## 2025-05-29 ASSESSMENT — SEVERITY ASSESSMENT 2020: SEVERITY 2020: MILD

## 2025-05-29 ASSESSMENT — PAIN INTENSITY VAS: HOW INTENSE IS YOUR PAIN 0 BEING NO PAIN, 10 BEING THE MOST SEVERE PAIN POSSIBLE?: NO PAIN

## 2025-05-29 ASSESSMENT — LOCATION DETAILED DESCRIPTION DERM
LOCATION DETAILED: RIGHT SUPERIOR POSTERIOR NECK
LOCATION DETAILED: RIGHT POSTERIOR NECK
LOCATION DETAILED: RIGHT INFERIOR OCCIPITAL SCALP

## 2025-05-29 ASSESSMENT — ITCH NUMERIC RATING SCALE: HOW SEVERE IS YOUR ITCHING?: 2

## 2025-05-29 ASSESSMENT — BSA RASH: BSA RASH: 1

## 2025-05-29 ASSESSMENT — LOCATION ZONE DERM
LOCATION ZONE: NECK
LOCATION ZONE: SCALP

## 2025-05-29 ASSESSMENT — LOCATION SIMPLE DESCRIPTION DERM
LOCATION SIMPLE: POSTERIOR SCALP
LOCATION SIMPLE: POSTERIOR NECK